# Patient Record
Sex: FEMALE | URBAN - METROPOLITAN AREA
[De-identification: names, ages, dates, MRNs, and addresses within clinical notes are randomized per-mention and may not be internally consistent; named-entity substitution may affect disease eponyms.]

---

## 2022-01-25 ENCOUNTER — APPOINTMENT (RX ONLY)
Dept: URBAN - METROPOLITAN AREA CLINIC 176 | Facility: CLINIC | Age: 37
Setting detail: DERMATOLOGY
End: 2022-01-25

## 2022-01-25 VITALS — WEIGHT: 180 LBS | HEIGHT: 64 IN

## 2022-01-25 DIAGNOSIS — L81.1 CHLOASMA: ICD-10-CM | Status: INADEQUATELY CONTROLLED

## 2022-01-25 PROCEDURE — ? COUNSELING

## 2022-01-25 PROCEDURE — 99203 OFFICE O/P NEW LOW 30 MIN: CPT

## 2022-01-25 PROCEDURE — ? PRESCRIPTION

## 2022-01-25 RX ORDER — H-QUINONE/TRETINOIN/HYDROCORT 6-0.05-0.5
EMULSION (GRAM) TOPICAL
Qty: 30 | Refills: 0 | Status: ERX | COMMUNITY
Start: 2022-01-25

## 2022-01-25 RX ADMIN — Medication: at 00:00

## 2022-01-25 ASSESSMENT — LOCATION SIMPLE DESCRIPTION DERM
LOCATION SIMPLE: RIGHT CHEEK
LOCATION SIMPLE: LEFT CHEEK

## 2022-01-25 ASSESSMENT — LOCATION ZONE DERM: LOCATION ZONE: FACE

## 2022-01-25 ASSESSMENT — LOCATION DETAILED DESCRIPTION DERM
LOCATION DETAILED: RIGHT INFERIOR CENTRAL MALAR CHEEK
LOCATION DETAILED: LEFT INFERIOR CENTRAL MALAR CHEEK

## 2022-01-25 NOTE — PROCEDURE: COUNSELING
Topical Retinoids Recommendations: Tretinoin
Detail Level: Detailed
Patient Specific Counseling (Will Not Stick From Patient To Patient): Obsessive SPF + vitamin C AM\\nPM- kevarya x 8 wks\\tita follow up transition to Finacea+tret

## 2022-05-18 ENCOUNTER — APPOINTMENT (RX ONLY)
Dept: URBAN - METROPOLITAN AREA CLINIC 176 | Facility: CLINIC | Age: 37
Setting detail: DERMATOLOGY
End: 2022-05-18

## 2022-05-18 VITALS — HEIGHT: 64 IN | WEIGHT: 178 LBS

## 2022-05-18 DIAGNOSIS — L81.1 CHLOASMA: ICD-10-CM

## 2022-05-18 PROCEDURE — ? PRESCRIPTION

## 2022-05-18 PROCEDURE — ? COUNSELING

## 2022-05-18 PROCEDURE — 99212 OFFICE O/P EST SF 10 MIN: CPT

## 2022-05-18 RX ORDER — H-QUINONE/TRETINOIN/HYDROCORT 6-0.05-0.5
EMULSION (GRAM) TOPICAL
Qty: 30 | Refills: 0 | Status: CANCELLED
Stop reason: SDUPTHER

## 2022-05-18 ASSESSMENT — LOCATION ZONE DERM: LOCATION ZONE: FACE

## 2022-05-18 ASSESSMENT — LOCATION SIMPLE DESCRIPTION DERM: LOCATION SIMPLE: LEFT CHEEK

## 2022-05-18 ASSESSMENT — LOCATION DETAILED DESCRIPTION DERM: LOCATION DETAILED: LEFT INFERIOR CENTRAL MALAR CHEEK

## 2022-05-18 NOTE — PROCEDURE: COUNSELING
Topical Retinoids Recommendations: Tretinoin
Detail Level: Detailed
Patient Specific Counseling (Will Not Stick From Patient To Patient): Obsessive SPF + vitamin C AM\\nPM- kevarya x 8 more wks due to significant improvement\\nAlso gave Mirvaso to treat vascular component \\nCould consider tranexamic acid po vs cyspera (can’t get pregnant and not on OCP)

## 2022-05-20 ENCOUNTER — RX ONLY (OUTPATIENT)
Age: 37
Setting detail: RX ONLY
End: 2022-05-20

## 2022-05-20 RX ORDER — H-QUINONE/TRETINOIN/HYDROCORT 8-0.05-0.5
EMULSION (GRAM) TOPICAL
Qty: 30 | Refills: 0 | Status: ERX | COMMUNITY
Start: 2022-05-19

## 2023-08-04 ENCOUNTER — APPOINTMENT (RX ONLY)
Dept: URBAN - METROPOLITAN AREA CLINIC 174 | Facility: CLINIC | Age: 38
Setting detail: DERMATOLOGY
End: 2023-08-04

## 2023-08-04 DIAGNOSIS — L81.1 CHLOASMA: ICD-10-CM

## 2023-08-04 PROCEDURE — ? COUNSELING

## 2023-08-04 PROCEDURE — ? PRESCRIPTION

## 2023-08-04 PROCEDURE — 99214 OFFICE O/P EST MOD 30 MIN: CPT

## 2023-08-04 PROCEDURE — ? TREATMENT REGIMEN

## 2023-08-04 RX ORDER — TRETIONIN 1 MG/G
1 CREAM TOPICAL
Qty: 45 | Refills: 3 | Status: ERX | COMMUNITY
Start: 2023-08-04

## 2023-08-04 RX ADMIN — TRETIONIN 1: 1 CREAM TOPICAL at 00:00

## 2023-08-04 ASSESSMENT — LOCATION ZONE DERM: LOCATION ZONE: FACE

## 2023-08-04 ASSESSMENT — LOCATION SIMPLE DESCRIPTION DERM: LOCATION SIMPLE: LEFT CHEEK

## 2023-08-04 ASSESSMENT — LOCATION DETAILED DESCRIPTION DERM: LOCATION DETAILED: LEFT INFERIOR CENTRAL MALAR CHEEK

## 2023-08-04 NOTE — PROCEDURE: TREATMENT REGIMEN
Plan: Discussed treatment options with patient topicals Triluma vs Laser treatment. Referred to [facility] for further evaluation and treatment.
Initiate Treatment: tretinoin 0.1 % topical cream Apply a pea size amount to face 3-4 times a week at bedtime. May increase to daily if tolerated.\\nWe discussed that Laser therapy is the most effective therapy to eliminate existing melasma. Other options includeTopical Bleaching Agents/hydroquinone/Topical Retinoids/tranaxemic acid\\nI recommended [facility] [place] ([facility] [place]) [address] for [facility] lasers.
Detail Level: Zone

## 2023-08-28 ENCOUNTER — APPOINTMENT (RX ONLY)
Dept: URBAN - METROPOLITAN AREA CLINIC 173 | Facility: CLINIC | Age: 38
Setting detail: DERMATOLOGY
End: 2023-08-28

## 2023-08-28 DIAGNOSIS — Z79.899 OTHER LONG TERM (CURRENT) DRUG THERAPY: ICD-10-CM

## 2023-08-28 DIAGNOSIS — Z41.9 ENCOUNTER FOR PROCEDURE FOR PURPOSES OTHER THAN REMEDYING HEALTH STATE, UNSPECIFIED: ICD-10-CM

## 2023-08-28 PROCEDURE — ? ADDITIONAL NOTES

## 2023-08-28 PROCEDURE — ? TREATMENT REGIMEN

## 2023-08-28 PROCEDURE — ? ORDER TESTS

## 2023-08-28 PROCEDURE — ? MEDICAL CONSULTATION: CHEMICAL PEELS

## 2023-08-28 PROCEDURE — ? PRESCRIPTION

## 2023-08-28 RX ORDER — PHARMACY COMPOUNDING ACCESSORY
EACH MISCELLANEOUS NIGHTLY
Qty: 30 | Refills: 6 | Status: ERX

## 2023-08-28 RX ORDER — PHARMACY COMPOUNDING ACCESSORY
EACH MISCELLANEOUS NIGHTLY
Qty: 30 | Refills: 6 | Status: ERX | COMMUNITY
Start: 2023-08-28

## 2023-08-28 RX ADMIN — Medication: at 00:00

## 2023-08-28 NOTE — PROCEDURE: ORDER TESTS
Bill For Surgical Tray: no
Performing Laboratory: [id number]
Expected Date Of Service: 08/28/2023
Billing Type: Third-Party Bill

## 2023-08-28 NOTE — PROCEDURE: ADDITIONAL NOTES
Detail Level: Simple
Additional Notes: Discussed oral tranexamic acid. Will consider prescribing  if topicals and chemical peels do not improve the appearance of melasma. \\n\\nRisks and side effects reviewed with the patient. Patient has no history of blood clots and does not smoke. \\n\\nWill order lab tests today. Patient given lab slip.\\n\\nLab tests ordered: Factor V Leiden Mutation, Prothrombin Time with INR, Protein C Activity, Partial\\nThromboplastin Time, Prothrombin (Factor II) 35413T>A Mutation, CBC w/ Differential, w/ Platelet, Protein S Activity
Render Risk Assessment In Note?: no

## 2023-08-28 NOTE — PROCEDURE: TREATMENT REGIMEN
Samples Given: [employer]Smart
Initiate Treatment: Compounded hydroquinone 4 months on, 4 months off. Compounded Tretinoin + Lytera on months not using hydroquinone.
Plan: Take Heliocare daily. \\nVI Peel: Precision Plus.\\nMay consider oral Tranexamic if topicals and chemical peels do not improve the appearance of melasma.
Detail Level: Zone